# Patient Record
Sex: FEMALE | Race: BLACK OR AFRICAN AMERICAN | NOT HISPANIC OR LATINO | URBAN - METROPOLITAN AREA
[De-identification: names, ages, dates, MRNs, and addresses within clinical notes are randomized per-mention and may not be internally consistent; named-entity substitution may affect disease eponyms.]

---

## 2022-04-28 ENCOUNTER — OUTPATIENT (OUTPATIENT)
Dept: OUTPATIENT SERVICES | Facility: HOSPITAL | Age: 67
LOS: 1 days | End: 2022-04-28
Payer: COMMERCIAL

## 2022-04-28 ENCOUNTER — APPOINTMENT (OUTPATIENT)
Dept: RADIOLOGY | Facility: CLINIC | Age: 67
End: 2022-04-28
Payer: MEDICARE

## 2022-04-28 DIAGNOSIS — R05.9 COUGH, UNSPECIFIED: ICD-10-CM

## 2022-04-28 PROCEDURE — 71046 X-RAY EXAM CHEST 2 VIEWS: CPT

## 2022-04-28 PROCEDURE — 71046 X-RAY EXAM CHEST 2 VIEWS: CPT | Mod: 26

## 2023-06-13 PROBLEM — Z00.00 ENCOUNTER FOR PREVENTIVE HEALTH EXAMINATION: Status: ACTIVE | Noted: 2023-06-13

## 2023-12-13 ENCOUNTER — APPOINTMENT (OUTPATIENT)
Dept: ORTHOPEDIC SURGERY | Facility: CLINIC | Age: 68
End: 2023-12-13
Payer: MEDICARE

## 2023-12-13 VITALS — BODY MASS INDEX: 32.2 KG/M2 | WEIGHT: 175 LBS | HEIGHT: 62 IN

## 2023-12-13 DIAGNOSIS — Z78.9 OTHER SPECIFIED HEALTH STATUS: ICD-10-CM

## 2023-12-13 DIAGNOSIS — Z87.09 PERSONAL HISTORY OF OTHER DISEASES OF THE RESPIRATORY SYSTEM: ICD-10-CM

## 2023-12-13 DIAGNOSIS — M25.461 EFFUSION, RIGHT KNEE: ICD-10-CM

## 2023-12-13 DIAGNOSIS — M23.91 UNSPECIFIED INTERNAL DERANGEMENT OF RIGHT KNEE: ICD-10-CM

## 2023-12-13 PROCEDURE — 99203 OFFICE O/P NEW LOW 30 MIN: CPT | Mod: 25

## 2023-12-13 PROCEDURE — 20611 DRAIN/INJ JOINT/BURSA W/US: CPT | Mod: RT

## 2023-12-13 PROCEDURE — 73564 X-RAY EXAM KNEE 4 OR MORE: CPT | Mod: RT

## 2023-12-13 PROCEDURE — J3490M: CUSTOM | Mod: NC

## 2023-12-13 RX ORDER — ALBUTEROL SULFATE 1.25 MG/3ML
1.25 SOLUTION RESPIRATORY (INHALATION)
Refills: 0 | Status: ACTIVE | COMMUNITY

## 2023-12-13 NOTE — HISTORY OF PRESENT ILLNESS
[Gradual] : gradual [7] : 7 [Tightness] : tightness [] : yes [Constant] : constant [de-identified] : 69yo female with right knee pain ongoing for about 8-9 months. Reports intermittent buckling and locking. Denies injury/trauma.  [FreeTextEntry1] : right knee [FreeTextEntry5] : No known injury, pain has been occurring for over a year. [FreeTextEntry7] : up and down the leg

## 2023-12-13 NOTE — PHYSICAL EXAM
[Right] : right knee [5___] : hamstring 5[unfilled]/5 [Equivocal] : equivocal Horacio [] : no pain with varus stress [TWNoteComboBox7] : flexion 105 degrees [de-identified] : extension 0 degrees

## 2023-12-13 NOTE — DISCUSSION/SUMMARY
[de-identified] : General Dx discussion The patient was advised of the diagnosis. The natural history of the pathology was explained in full to the patient in layman's terms. All questions were answered. The risks and benefits of surgical and non-surgical treatment alternatives were explained in full to the patient.   Case discussed. Aspiration and injection today tolerated well. She will be sent for an MRI to r/o a MMT / baker's cyst. Follow up after MRI.  Entered by BENITO Jon acting as scribe. - The documentation recorded by the scribe accurately reflects the service I personally performed and the decisions made by me.

## 2023-12-19 ENCOUNTER — APPOINTMENT (OUTPATIENT)
Dept: MRI IMAGING | Facility: CLINIC | Age: 68
End: 2023-12-19

## 2023-12-26 ENCOUNTER — APPOINTMENT (OUTPATIENT)
Dept: ORTHOPEDIC SURGERY | Facility: CLINIC | Age: 68
End: 2023-12-26

## 2024-01-17 ENCOUNTER — APPOINTMENT (OUTPATIENT)
Dept: MRI IMAGING | Facility: CLINIC | Age: 69
End: 2024-01-17

## 2024-01-18 ENCOUNTER — APPOINTMENT (OUTPATIENT)
Dept: MRI IMAGING | Facility: CLINIC | Age: 69
End: 2024-01-18
Payer: MEDICARE

## 2024-01-18 PROCEDURE — 73721 MRI JNT OF LWR EXTRE W/O DYE: CPT | Mod: RT,MH

## 2024-01-22 ENCOUNTER — APPOINTMENT (OUTPATIENT)
Dept: ORTHOPEDIC SURGERY | Facility: CLINIC | Age: 69
End: 2024-01-22
Payer: MEDICARE

## 2024-01-22 DIAGNOSIS — M71.21 SYNOVIAL CYST OF POPLITEAL SPACE [BAKER], RIGHT KNEE: ICD-10-CM

## 2024-01-22 DIAGNOSIS — S83.241D OTHER TEAR OF MEDIAL MENISCUS, CURRENT INJURY, RIGHT KNEE, SUBSEQUENT ENCOUNTER: ICD-10-CM

## 2024-01-22 PROCEDURE — 99213 OFFICE O/P EST LOW 20 MIN: CPT

## 2024-01-22 NOTE — DATA REVIEWED
[MRI] : MRI [Right] : of the right [Knee] : knee [Report was reviewed and noted in the chart] : The report was reviewed and noted in the chart [I reviewed the films/CD] : I reviewed the films/CD [FreeTextEntry1] : 1. MMT, medial and patellofemoral arthrosis. 2. Multiple sprains and tendinopathy, moderate effusion, synovitis, capsulitis, popliteal cyst and subcutaneous edema. 3. Degenerative vs. inflammatory cysts posterior central tibial plateau.

## 2024-01-22 NOTE — PHYSICAL EXAM
[Right] : right knee [5___] : hamstring 5[unfilled]/5 [Equivocal] : equivocal Horacio [] : no pain with varus stress [TWNoteComboBox7] : flexion 105 degrees [de-identified] : extension 0 degrees

## 2024-01-22 NOTE — DISCUSSION/SUMMARY
[de-identified] : General Dx discussion The patient was advised of the diagnosis. The natural history of the pathology was explained in full to the patient in layman's terms. All questions were answered. The risks and benefits of surgical and non-surgical treatment alternatives were explained in full to the patient.   MRI pictures and report reviewed. Case discussed. Recommend aspiration and injection of popliteal cyst. Discussed the possibility of visco injections in the future if pain persists. Follow up in 6 weeks.   Entered by BENITO Jon acting as scribe. - The documentation recorded by the scribe accurately reflects the service I personally performed and the decisions made by me.

## 2024-01-22 NOTE — HISTORY OF PRESENT ILLNESS
[FreeTextEntry1] : Right knee [de-identified] : Patient is here for MRI results of her right knee. States pain is intermittent.

## 2024-02-06 ENCOUNTER — RESULT REVIEW (OUTPATIENT)
Age: 69
End: 2024-02-06

## 2024-03-04 ENCOUNTER — APPOINTMENT (OUTPATIENT)
Dept: ORTHOPEDIC SURGERY | Facility: CLINIC | Age: 69
End: 2024-03-04
Payer: MEDICARE

## 2024-03-04 DIAGNOSIS — M17.11 UNILATERAL PRIMARY OSTEOARTHRITIS, RIGHT KNEE: ICD-10-CM

## 2024-03-04 PROCEDURE — 99213 OFFICE O/P EST LOW 20 MIN: CPT | Mod: 25

## 2024-03-04 PROCEDURE — 20611 DRAIN/INJ JOINT/BURSA W/US: CPT | Mod: RT

## 2024-03-04 NOTE — PROCEDURE
[Large Joint Injection] : Large joint injection [Right] : of the right [Knee] : knee [Pain] : pain [Inflammation] : inflammation [X-ray evidence of Osteoarthritis on this or prior visit] : x-ray evidence of Osteoarthritis on this or prior visit [Alcohol] : alcohol [Betadine] : betadine [Ethyl Chloride sprayed topically] : ethyl chloride sprayed topically [Sterile technique used] : sterile technique used [Gel-One (30mg)] : 30mg of Gel-One [] : Patient tolerated procedure well [Call if redness, pain or fever occur] : call if redness, pain or fever occur [Apply ice for 15min out of every hour for the next 12-24 hours as tolerated] : apply ice for 15 minutes out of every hour for the next 12-24 hours as tolerated [Previous OTC use and PT nontherapeutic] : patient has tried OTC's including aspirin, Ibuprofen, Aleve, etc or prescription NSAIDS, and/or exercises at home and/or physical therapy without satisfactory response [Patient had decreased mobility in the joint] : patient had decreased mobility in the joint [Risks, benefits, alternatives discussed / Verbal consent obtained] : the risks benefits, and alternatives have been discussed, and verbal consent was obtained [Prior failure or difficult injection] : prior failure or difficult injection [All ultrasound images have been permanently captured and stored accordingly in our picture archiving and communication system] : All ultrasound images have been permanently captured and stored accordingly in our picture archiving and communication system [Visualization of the needle and placement of injection was performed without complication] : visualization of the needle and placement of injection was performed without complication

## 2024-03-04 NOTE — PHYSICAL EXAM
[Right] : right knee [5___] : hamstring 5[unfilled]/5 [Equivocal] : equivocal Horacio [] : patient ambulates without assistive device [TWNoteComboBox7] : flexion 105 degrees [de-identified] : extension 0 degrees

## 2024-03-04 NOTE — HISTORY OF PRESENT ILLNESS
[6] : 6 [0] : 0 [Dull/Aching] : dull/aching [Frequent] : frequent [Household chores] : household chores [Leisure] : leisure [Sleep] : sleep [de-identified] : Patient is here for a follow up on her right knee. Pain has slightly improved but she is still limping. [] : no [FreeTextEntry1] : Right knee [FreeTextEntry9] : Advil [de-identified] : pressure [de-identified] : none

## 2024-03-04 NOTE — DISCUSSION/SUMMARY
[de-identified] : General Dx discussion The patient was advised of the diagnosis. The natural history of the pathology was explained in full to the patient in layman's terms. All questions were answered. The risks and benefits of surgical and non-surgical treatment alternatives were explained in full to the patient.   will try gel one  will see Dr Stewart or Ana Luisa for eval for TKA

## 2024-05-07 ENCOUNTER — APPOINTMENT (OUTPATIENT)
Dept: ORTHOPEDIC SURGERY | Facility: CLINIC | Age: 69
End: 2024-05-07

## 2024-06-10 ENCOUNTER — APPOINTMENT (OUTPATIENT)
Dept: ORTHOPEDIC SURGERY | Facility: CLINIC | Age: 69
End: 2024-06-10
Payer: MEDICARE

## 2024-06-10 VITALS — WEIGHT: 165 LBS | BODY MASS INDEX: 30.36 KG/M2 | HEIGHT: 62 IN

## 2024-06-10 DIAGNOSIS — M54.16 RADICULOPATHY, LUMBAR REGION: ICD-10-CM

## 2024-06-10 DIAGNOSIS — Z87.891 PERSONAL HISTORY OF NICOTINE DEPENDENCE: ICD-10-CM

## 2024-06-10 DIAGNOSIS — M41.9 SCOLIOSIS, UNSPECIFIED: ICD-10-CM

## 2024-06-10 PROCEDURE — 72170 X-RAY EXAM OF PELVIS: CPT

## 2024-06-10 PROCEDURE — 72100 X-RAY EXAM L-S SPINE 2/3 VWS: CPT

## 2024-06-10 PROCEDURE — 99214 OFFICE O/P EST MOD 30 MIN: CPT

## 2024-06-10 RX ORDER — METHYLPREDNISOLONE 4 MG/1
4 TABLET ORAL
Qty: 1 | Refills: 0 | Status: ACTIVE | COMMUNITY
Start: 2024-06-10 | End: 1900-01-01

## 2024-06-10 NOTE — HISTORY OF PRESENT ILLNESS
[Gradual] : gradual [Result of repetitive motion] : result of repetitive motion [9] : 9 [Dull/Aching] : dull/aching [Occasional] : occasional [Household chores] : household chores [Social interactions] : social interactions [Nothing helps with pain getting better] : Nothing helps with pain getting better [Retired] : Work status: retired [de-identified] : 69yo female with pain, numbness, tingling and cramps in her posterior thigh for the last 3-4 weeks. Reports radiation to her foot intermittently. Symptoms improve with leaning forward. Denies injury/trauma. Denies bowel/bladder dysfunction.  [] : Post Surgical Visit: no [FreeTextEntry6] : numbness and tingling

## 2024-06-10 NOTE — IMAGING
[Facet arthropathy] : Facet arthropathy [Disc space narrowing] : Disc space narrowing [Mild arthritis (Tonnis Grade 1)] : Mild arthritis (Tonnis Grade 1)

## 2024-06-10 NOTE — DISCUSSION/SUMMARY
[de-identified] : General Dx discussion The patient was advised of the diagnosis. The natural history of the pathology was explained in full to the patient in layman's terms. All questions were answered. The risks and benefits of surgical and non-surgical treatment alternatives were explained in full to the patient.   Case discussed. Will start a medrol dose pack. She will be sent for a course of PT. Follow up in 4-6 weeks.     Entered by BENITO Jon acting as scribe. - The documentation recorded by the scribe accurately reflects the service I personally performed and the decisions made by me.

## 2024-06-21 ENCOUNTER — APPOINTMENT (OUTPATIENT)
Dept: ORTHOPEDIC SURGERY | Facility: CLINIC | Age: 69
End: 2024-06-21

## 2024-07-22 ENCOUNTER — APPOINTMENT (OUTPATIENT)
Dept: ORTHOPEDIC SURGERY | Facility: CLINIC | Age: 69
End: 2024-07-22